# Patient Record
Sex: FEMALE
[De-identification: names, ages, dates, MRNs, and addresses within clinical notes are randomized per-mention and may not be internally consistent; named-entity substitution may affect disease eponyms.]

---

## 2022-09-15 ENCOUNTER — HOSPITAL ENCOUNTER (OUTPATIENT)
Dept: HOSPITAL 19 - MC.RAD | Age: 67
End: 2022-09-15
Attending: SURGERY
Payer: MEDICARE

## 2022-09-15 DIAGNOSIS — Z98.82: Primary | ICD-10-CM

## 2022-09-15 PROCEDURE — C1769 GUIDE WIRE: HCPCS

## 2022-09-15 PROCEDURE — 32603: CPT

## 2022-09-16 ENCOUNTER — HOSPITAL ENCOUNTER (OUTPATIENT)
Dept: HOSPITAL 19 - SDCO | Age: 67
Discharge: HOME | End: 2022-09-16
Attending: SURGERY
Payer: MEDICARE

## 2022-09-16 VITALS — TEMPERATURE: 98.4 F | DIASTOLIC BLOOD PRESSURE: 64 MMHG | HEART RATE: 74 BPM | SYSTOLIC BLOOD PRESSURE: 117 MMHG

## 2022-09-16 VITALS — SYSTOLIC BLOOD PRESSURE: 136 MMHG | DIASTOLIC BLOOD PRESSURE: 80 MMHG | HEART RATE: 64 BPM

## 2022-09-16 VITALS — DIASTOLIC BLOOD PRESSURE: 55 MMHG | TEMPERATURE: 97.8 F | SYSTOLIC BLOOD PRESSURE: 110 MMHG | HEART RATE: 72 BPM

## 2022-09-16 VITALS — WEIGHT: 179.68 LBS | HEIGHT: 61 IN | BODY MASS INDEX: 33.92 KG/M2

## 2022-09-16 VITALS — DIASTOLIC BLOOD PRESSURE: 60 MMHG | SYSTOLIC BLOOD PRESSURE: 112 MMHG | HEART RATE: 62 BPM

## 2022-09-16 DIAGNOSIS — F17.210: ICD-10-CM

## 2022-09-16 DIAGNOSIS — C50.912: ICD-10-CM

## 2022-09-16 DIAGNOSIS — Z12.11: Primary | ICD-10-CM

## 2022-09-16 DIAGNOSIS — N60.92: ICD-10-CM

## 2022-09-16 DIAGNOSIS — I10: ICD-10-CM

## 2022-09-16 DIAGNOSIS — K64.8: ICD-10-CM

## 2022-09-16 DIAGNOSIS — K57.30: ICD-10-CM

## 2022-09-16 PROCEDURE — 19301 PARTIAL MASTECTOMY: CPT

## 2022-09-16 PROCEDURE — A4648 IMPLANTABLE TISSUE MARKER: HCPCS

## 2022-09-16 NOTE — NUR
0825 - PT arrives from OR drowsy but oriented; denies current pain. Monitors
applied and VSS. Bandage to affected area is clean, dry and intact. Verbal
bedside report obtained. Side rails x2, non-slip socks remain on. Snack and
drink provided; visitor brought into room per PT request. Call bell is within
reach.
 
0840 - VSS. PT denies nausea/pain.  is speaking w/ PT. Call bell remains
within reach if needed.

## 2022-09-16 NOTE — NUR
0855 - [facility]. PT expressed desire to be discharged. IV discontinued. Catheter
tip intact and pressure bandage applied, no redness/swelling noted. DC
instructions and educational material reviewed w/ PT who verbalized
understanding and signed the realted paperwork. Questions answered to PT
satisfaction. PT refused RN assistance changing into personal clothes; call
bell remains within reach if needed. Visitor left to wait in waiting room per
PT request.

## 2022-09-16 NOTE — NUR
0920 - PT dismissed from [facility] via wheelchair by Ghassan to PT entrence; PT
visitor has DC packet and the PT has her personal belongings. PT was
transferred into the care of , who is driving private car.